# Patient Record
Sex: MALE | Race: WHITE
[De-identification: names, ages, dates, MRNs, and addresses within clinical notes are randomized per-mention and may not be internally consistent; named-entity substitution may affect disease eponyms.]

---

## 2017-10-11 ENCOUNTER — HOSPITAL ENCOUNTER (EMERGENCY)
Dept: HOSPITAL 25 - ED | Age: 62
Discharge: ANOTHER HOSPITAL | End: 2017-10-11
Payer: OTHER GOVERNMENT

## 2017-10-11 VITALS — DIASTOLIC BLOOD PRESSURE: 68 MMHG | SYSTOLIC BLOOD PRESSURE: 137 MMHG

## 2017-10-11 DIAGNOSIS — M79.671: ICD-10-CM

## 2017-10-11 DIAGNOSIS — I74.3: Primary | ICD-10-CM

## 2017-10-11 DIAGNOSIS — I96: ICD-10-CM

## 2017-10-11 LAB
ALBUMIN SERPL BCG-MCNC: 3.4 G/DL (ref 3.2–5.2)
ALP SERPL-CCNC: 71 U/L (ref 34–104)
ALT SERPL W P-5'-P-CCNC: 15 U/L (ref 7–52)
ANION GAP SERPL CALC-SCNC: 10 MMOL/L (ref 2–11)
AST SERPL-CCNC: 14 U/L (ref 13–39)
BUN SERPL-MCNC: 19 MG/DL (ref 6–24)
BUN/CREAT SERPL: 22.4 (ref 8–20)
CALCIUM SERPL-MCNC: 9.3 MG/DL (ref 8.6–10.3)
CHLORIDE SERPL-SCNC: 100 MMOL/L (ref 101–111)
GLOBULIN SER CALC-MCNC: 4.1 G/DL (ref 2–4)
GLUCOSE SERPL-MCNC: 151 MG/DL (ref 70–100)
HCO3 SERPL-SCNC: 24 MMOL/L (ref 22–32)
HCT VFR BLD AUTO: 29 % (ref 42–52)
HGB BLD-MCNC: 9.7 G/DL (ref 14–18)
MCH RBC QN AUTO: 31 PG (ref 27–31)
MCHC RBC AUTO-ENTMCNC: 34 G/DL (ref 31–36)
MCV RBC AUTO: 93 FL (ref 80–94)
POTASSIUM SERPL-SCNC: 4.1 MMOL/L (ref 3.5–5)
PROT SERPL-MCNC: 7.5 G/DL (ref 6.4–8.9)
RBC # BLD AUTO: 3.11 10^6/UL (ref 4–5.4)
SODIUM SERPL-SCNC: 134 MMOL/L (ref 133–145)
WBC # BLD AUTO: 18.9 10^3/UL (ref 3.5–10.8)

## 2017-10-11 PROCEDURE — 85025 COMPLETE CBC W/AUTO DIFF WBC: CPT

## 2017-10-11 PROCEDURE — 87205 SMEAR GRAM STAIN: CPT

## 2017-10-11 PROCEDURE — 96374 THER/PROPH/DIAG INJ IV PUSH: CPT

## 2017-10-11 PROCEDURE — 87070 CULTURE OTHR SPECIMN AEROBIC: CPT

## 2017-10-11 PROCEDURE — 36415 COLL VENOUS BLD VENIPUNCTURE: CPT

## 2017-10-11 PROCEDURE — 87150 DNA/RNA AMPLIFIED PROBE: CPT

## 2017-10-11 PROCEDURE — 87186 SC STD MICRODIL/AGAR DIL: CPT

## 2017-10-11 PROCEDURE — 87640 STAPH A DNA AMP PROBE: CPT

## 2017-10-11 PROCEDURE — 86140 C-REACTIVE PROTEIN: CPT

## 2017-10-11 PROCEDURE — 87641 MR-STAPH DNA AMP PROBE: CPT

## 2017-10-11 PROCEDURE — 87040 BLOOD CULTURE FOR BACTERIA: CPT

## 2017-10-11 PROCEDURE — 83605 ASSAY OF LACTIC ACID: CPT

## 2017-10-11 PROCEDURE — 87077 CULTURE AEROBIC IDENTIFY: CPT

## 2017-10-11 PROCEDURE — 81003 URINALYSIS AUTO W/O SCOPE: CPT

## 2017-10-11 PROCEDURE — 85652 RBC SED RATE AUTOMATED: CPT

## 2017-10-11 PROCEDURE — 99285 EMERGENCY DEPT VISIT HI MDM: CPT

## 2017-10-11 PROCEDURE — 80053 COMPREHEN METABOLIC PANEL: CPT

## 2017-10-11 NOTE — ED
David CASTRO Angela, scribed for Blaine Seymour MD on 10/11/17 at 1718 .





Lower Extremity





- HPI Summary


HPI Summary: 





This pt is a 63 y/o male presenting to [facility] c/o right foot pain especially the 

first and second toe for the last 2 weeks. He states that he noticed his toe 

was black just today may be late last night. Pt reports  he had 4 stents placed 

in his right leg approximately 12 months ago by his surgeon in [place]. He 

reports that he had a scheduled appointment with his vascular surgeon on 

October 2nd but they called him and it was moved to October 30th. Pt lives in 

an assisted living care facility ([facility]) in [place], [place]. He has PMH 

significant for DM, HTN, dyslipidemia and PAD and PVD. HE still smoke 5 

cigarrets per day and he takes and ASA daily. 





- History of Current Complaint


Chief Complaint: EDExtremityLower


Stated Complaint: RT FOOT/GREAT TOE PAIN


Time Seen by Provider: 10/11/17 17:06


Hx Obtained From: Patient


Onset of Pain: Days - right great toe pain


Onset/Duration: Weeks - right great toe pain


Pain Intensity: 8


Pain Scale Used: 0-10 Numeric


Timing: Constant, Lasting Weeks - pain


Location: Is Discrete @ - right great toe


Associated Signs And Symptoms: Positive: Other - black great toe





- Allergies/Home Medications


Allergies/Adverse Reactions: 


 Allergies











Allergy/AdvReac Type Severity Reaction Status Date / Time


 


No Known Allergies Allergy   Verified 10/11/17 14:11











Home Medications: 


 Home Medications





Acetaminophen TAB* [Tylenol TAB*] 650 mg PO Q6H PRN 10/11/17 [History Confirmed 

10/11/17]


Aspirin EC Low Dose* [Ecotrin EC Low Dose 81 MG*] 81 mg PO DAILY 10/11/17 [

History Confirmed 10/11/17]


Atenolol TAB* [Tenormin TAB* 25 MG] 25 mg PO DAILY 10/11/17 [History Confirmed 

10/11/17]


Atorvastatin* [Lipitor*] 40 mg PO DAILY 10/11/17 [History Confirmed 10/11/17]


Cholecalciferol TAB* [Vitamin D TAB*] 1,000 unit PO DAILY 10/11/17 [History 

Confirmed 10/11/17]


Collagenase 250 MG/GM OINT* [Santyl 250 mg/gm Oint*] 1 applic TOPICAL DAILY PRN 

10/11/17 [History Confirmed 10/11/17]


Emollient [Lubriderm Daily Moisture] 1 lot TOPICAL TID PRN 10/11/17 [History 

Confirmed 10/11/17]


Ezetimibe TAB* [Zetia TAB*] 10 mg PO DAILY 10/11/17 [History Confirmed 10/11/17]


Gabapentin CAP(*) [Neurontin 400 mg CAP(*)] 400 mg PO BEDTIME 10/11/17 [History 

Confirmed 10/11/17]


Lisinopril TAB* [Prinivil TAB*] 10 mg PO DAILY 10/11/17 [History Confirmed 10/11

/17]


Venlafaxine HCl [Effexor XR-] 37.5 mg PO BID 10/11/17 [History Confirmed 10/11/

17]


buPROPion TAB* [Wellbutrin TAB*] 75 mg PO BID 10/11/17 [History Confirmed 10/11/

17]


glipiZIDE TAB* [Glucotrol TAB*] 5 mg PO QPM 10/11/17 [History Confirmed 10/11/17

]


glipiZIDE TAB* [Glucotrol TAB*] 7.5 mg PO QAM 10/11/17 [History Confirmed 10/11/

17]


metFORMIN* [Glucophage 500 MG TAB *] 1,000 mg PO BID 10/11/17 [History 

Confirmed 10/11/17]











PMH/Surg Hx/FS Hx/Imm Hx


Endocrine/Hematology History: Reports: Hx Diabetes


Cardiovascular History: 


   Denies: Hx Hypertension


Infectious Disease History: No


Infectious Disease History: Reports: Hx of Known/Suspected MRSA


   Denies: Traveled Outside the [place] in Last 30 Days





- Social History


Lives: Assisted Living - [facility]


Alcohol Use: None


Hx Substance Use: No


Substance Use Type: Reports: None





Review of Systems


Negative: Fever, Chills


Eyes: Negative


ENT: Negative


Cardiovascular: Negative


Respiratory: Negative


Genitourinary: Negative


Positive: Other - right great toe pain


Positive: Other - black right great toe


All Other Systems Reviewed And Are Negative: Yes





Physical Exam





- Summary


Physical Exam Summary: 





VITAL SIGNS: Reviewed.


GENERAL: Patient is a well-developed and nourished male who is lying 

comfortable in the stretcher. Patient is not in any acute respiratory distress.


HEAD AND FACE: No signs of trauma. No ecchymosis, hematomas or skull 

depressions. No sinus tenderness.


EYES: PERRLA, EOMI x 2, No injected conjunctiva, no nystagmus.


EARS: Hearing grossly intact. Ear canals and tympanic membranes are within 

normal limits.


MOUTH: Oropharynx within normal limits.


NECK: Supple, trachea is midline, no adenopathy, no JVD, no carotid bruit, no c-

spine tenderness, neck with full ROM.


CHEST: Symmetric, no tenderness at palpation


LUNGS: Clear to auscultation bilaterally. No wheezing or crackles.


CVS: Regular rate and rhythm, S1 and S2 present, no murmurs or gallops 

appreciated.


ABDOMEN: Soft, non-tender. No signs of distention. No rebound no guarding, and 

no masses palpated. Bowel sounds are normal.


EXTREMITIES: FROM in all major joints, no edema, no cyanosis or clubbing. There 

are no pulses in the right lower extremity, not even with a doppler. On the RLE

: there is necrosis on the first digit and on the second digit there is white 

discoloration secondary to vascular insufficiency. This has been going on for 2 

weeks.


NEURO: Alert and oriented x 3. No acute neurological deficits. Speech is normal 

and follows commands.


SKIN: Dry and warm


Triage Information Reviewed: Yes


Vital Signs On Initial Exam: 


 Initial Vitals











Temp Pulse Resp BP Pulse Ox


 


 97.0 F   72   16   109/52   97 


 


 10/11/17 14:11  10/11/17 14:11  10/11/17 14:11  10/11/17 14:11  10/11/17 14:11











Vital Signs Reviewed: Yes





Diagnostics





- Vital Signs


 Vital Signs











  Temp Pulse Resp BP Pulse Ox


 


 10/11/17 14:11  97.0 F  72  16  109/52  97














- Laboratory


Lab Results: 


 Lab Results











  10/11/17 Range/Units





  17:38 


 


WBC  18.9 H  (3.5-10.8)  10^3/ul


 


RBC  3.11 L  (4.0-5.4)  10^6/ul


 


Hgb  9.7 L  (14.0-18.0)  g/dl


 


Hct  29 L  (42-52)  %


 


MCV  93  (80-94)  fL


 


MCH  31  (27-31)  pg


 


MCHC  34  (31-36)  g/dl


 


RDW  14  (10.5-15)  %


 


Plt Count  558 H  (150-450)  10^3/ul


 


MPV  7 L  (7.4-10.4)  um3


 


Neut % (Auto)  81.3  (38-83)  %


 


Lymph % (Auto)  9.2 L  (25-47)  %


 


Mono % (Auto)  7.5  (1-9)  %


 


Eos % (Auto)  1.2  (0-6)  %


 


Baso % (Auto)  0.8  (0-2)  %


 


Absolute Neuts (auto)  15.4 H  (1.5-7.7)  10^3/ul


 


Absolute Lymphs (auto)  1.7  (1.0-4.8)  10^3/ul


 


Absolute Monos (auto)  1.4 H  (0-0.8)  10^3/ul


 


Absolute Eos (auto)  0.2  (0-0.6)  10^3/ul


 


Absolute Basos (auto)  0.1  (0-0.2)  10^3/ul


 


Absolute Nucleated RBC  0  10^3/ul


 


Nucleated RBC %  0  


 


ESR  Pending  











Result Diagrams: 


 10/11/17 17:38





 10/11/17 17:38


Lab Statement: Any lab studies that have been ordered have been reviewed, and 

results considered in the medical decision making process.





- Radiology


  ** Right foot XR


Xray Interpretation: Positive (See Comments) - IMPRESSION: Soft tissue 

swelling. No radiographic stigmata of osteomyelitis. ED physician has reviewed 

this radiology report and agrees.


Radiology Interpretation Completed By: Radiologist





Lower Extremity Course/Dx





- Course


Assessment/Plan: This pt is a 63 y/o male presenting to [facility] c/o right foot 

pain especially the first and second toe for the last 2 weeks. He states that 

he noticed his toe was black just today may be late last night. Pt reports  he 

had 4 stents placed in his right leg approximately 12 months ago by his surgeon 

in [place]. He reports that he had a scheduled appointment with his vascular 

surgeon on October 2nd but they called him and it was moved to October 30th. Pt 

lives in an assisted living care facility ([facility]) in [place], [place]. He has PMH 

significant for DM, HTN, dyslipidemia and PAD and PVD. HE still smoke 5 

cigarrets per day and he takes and ASA daily.  Blood work w/o a significant 

abnormality except for WBCs 18.9, slight anemia hb 9.7 and Hct 29. Platelets 

558. Glucose 151, lactic acid 2.1, .  Initially I was not able to feel 

pulses. Using doppler I was not able to obtain pulses. I obtained a IV access 

and IVF given. He was given Vancomycin for his infection and gangrene.  I 

discussed my physical exam and findings with Dr. Adrian from [facility] and he accepted the patient for transfer.  Dr. Adrian agreed with 

current management.  Patient was given one dose of Morphine for pain.  Patient 

is hemodynamically stable and A+OX3.





- Diagnoses


Differential Diagnosis/HQI/PQRI: Positive: Cellulitis, Fracture (Closed), 

Infection, Osteomyelitis


Provider Diagnoses: 


 acute ischemic RLE, secondary to arterial occlusion, Necrosis of toe








- Physician Notifications


Discussed Care Of Patient With: Dr. Adrian


Time Discussed With Above Provider: 17:50


Instructed by Provider To: Other - I discussed the pt's case with Dr. Adrian, 

from [facility], who has agreed to admit the pt.





Discharge





- Discharge Plan


Condition: Stable


Disposition: TRANS HIGHER LVL OF CARE FAC


Discharge Disposition Comment: [facility]


Referrals: 


Non Staff,Doctor [Primary Care Provider] - 





The documentation as recorded by the David dillard Angela accurately reflects 

the service I personally performed and the decisions made by me, Blaine Seymour MD.

## 2017-10-11 NOTE — RAD
Indication: Pain and swelling. Necrotic fifth digit.



Comparison: No relevant prior exams available on the [facility] PACS for comparison.



Technique: AP, lateral, and oblique views RIGHT foot. 



Report: Soft tissue swelling about the foot without focality. No subcutaneous emphysema

evident. No fracture, malalignment, periosteal reaction, or osteolysis evident. Normal

variant bipartite sesamoid at the lateral head of the flexor hallucis brevis.



IMPRESSION:  Soft tissue swelling. No radiographic stigmata of osteomyelitis.

## 2017-10-12 NOTE — PN
Progress Note





- Progress Note


Date of Service: 10/12/17


Note: 


Patient wound culture grew MRSA.  patient given vancomycin here prior to 

transfer to [facility] so no further action needed.

## 2018-12-03 ENCOUNTER — HOSPITAL ENCOUNTER (EMERGENCY)
Dept: HOSPITAL 25 - ED | Age: 63
Discharge: HOME | End: 2018-12-03
Payer: OTHER GOVERNMENT

## 2018-12-03 VITALS — DIASTOLIC BLOOD PRESSURE: 59 MMHG | SYSTOLIC BLOOD PRESSURE: 129 MMHG

## 2018-12-03 DIAGNOSIS — Z87.891: ICD-10-CM

## 2018-12-03 DIAGNOSIS — D72.829: ICD-10-CM

## 2018-12-03 DIAGNOSIS — E11.65: Primary | ICD-10-CM

## 2018-12-03 DIAGNOSIS — Z79.84: ICD-10-CM

## 2018-12-03 DIAGNOSIS — E86.0: ICD-10-CM

## 2018-12-03 LAB
HCT VFR BLD AUTO: 42 % (ref 42–52)
HGB BLD-MCNC: 13.9 G/DL (ref 14–18)
MCH RBC QN AUTO: 32 PG (ref 27–31)
MCHC RBC AUTO-ENTMCNC: 33 G/DL (ref 31–36)
MCV RBC AUTO: 95 FL (ref 80–94)
NEUTROPHILS # BLD: 14.8 10^3/UL (ref 1.5–7.7)
PLATELET # BLD AUTO: 264 10^3/UL (ref 150–450)
RBC # BLD AUTO: 4.39 10^6/UL (ref 4–5.4)
WBC # BLD AUTO: 16.4 10^3/UL (ref 3.5–10.8)

## 2018-12-03 PROCEDURE — 81003 URINALYSIS AUTO W/O SCOPE: CPT

## 2018-12-03 PROCEDURE — 36415 COLL VENOUS BLD VENIPUNCTURE: CPT

## 2018-12-03 PROCEDURE — 71046 X-RAY EXAM CHEST 2 VIEWS: CPT

## 2018-12-03 PROCEDURE — 85025 COMPLETE CBC W/AUTO DIFF WBC: CPT

## 2018-12-03 PROCEDURE — 99283 EMERGENCY DEPT VISIT LOW MDM: CPT

## 2018-12-03 PROCEDURE — 80053 COMPREHEN METABOLIC PANEL: CPT

## 2018-12-03 NOTE — ED
Complex/Multi-Sys Presentation





- HPI Summary


HPI Summary: 


Patient is a 63-year-old male who presents emergency department for elevated 

glucose.  Patient resides at the [facility] and has a history of drug and 

alcohol abuse.  Patient states that his glucose was checked today and was noted 

to be in the 300s and was referred to the ER.  Patient has no complaints.  

Patient denies recent illness, fever or chills, cough, abdominal pain, urinary 

symptoms.  He does note one episode of vomiting today without diarrhea.  

Symptoms are moderate in severity.  No current modifying factors.  Patient 

currently taking metformin and glipizide for glucose control.








- History Of Current Complaint


Chief Complaint: EDDiabeticProb


Time Seen by Provider: 12/03/18 13:22


Hx Obtained From: Patient, Medical Records





- Allergies/Home Medications


Allergies/Adverse Reactions: 


 Allergies











Allergy/AdvReac Type Severity Reaction Status Date / Time


 


No Known Allergies Allergy   Verified 10/11/17 14:11














PMH/Surg Hx/FS Hx/Imm Hx


Previously Healthy: Yes


Endocrine/Hematology History: Reports: Hx Diabetes


Cardiovascular History: 


   Denies: Hx Hypertension


Infectious Disease History: No


Infectious Disease History: Reports: Hx of Known/Suspected MRSA


   Denies: Traveled Outside the [place] in Last 30 Days





- Family History


Known Family History: Positive: Diabetes





- Social History


Occupation: Retired


Lives: Assisted Living


Alcohol Use: None


Hx Substance Use: No


Substance Use Type: Reports: None


Smoking Status (MU): Former Smoker





Review of Systems


Constitutional: Negative


Negative: Fever, Chills


Eyes: Negative


ENT: Negative


Cardiovascular: Negative


Respiratory: Negative


Positive: Vomiting.  Negative: Abdominal Pain, Diarrhea, Nausea


Genitourinary: Negative


Musculoskeletal: Negative


Skin: Negative


Neurological: Negative


All Other Systems Reviewed And Are Negative: Yes





Physical Exam


Triage Information Reviewed: Yes


Vital Signs On Initial Exam: 


 Initial Vitals











Temp Pulse Resp BP Pulse Ox


 


 96.2 F   63   18   160/85   96 


 


 12/03/18 13:19  12/03/18 13:19  12/03/18 13:19  12/03/18 13:19  12/03/18 13:19











Vital Signs Reviewed: Yes


Appearance: Positive: Well-Appearing - Pt. sitting up in NAD. Pleasant. Answers 

questions appropriately.


Skin: Positive: Warm, Dry


Head/Face: Positive: Normal Head/Face Inspection


Eyes: Positive: Normal, EOMI


Neck: Positive: Supple


Respiratory/Lung Sounds: Positive: Clear to Auscultation, Breath Sounds Present


Cardiovascular: Positive: Normal, RRR


Abdomen Description: Positive: Nontender, Soft


Musculoskeletal: Positive: Other - Partial amputation of right foot with healed 

wounds. No ulcerations or wounds to bilateral lower extremities.


Neurological: Positive: Normal, CN Intact II-III


Psychiatric: Positive: Affect/Mood Appropriate





- Chase Coma Scale


Best Eye Response: 4 - Spontaneous


Best Motor Response: 6 - Obeys Commands


Best Verbal Response: 5 - Oriented


Coma Scale Total: 15





Diagnostics





- Vital Signs


 Vital Signs











  Temp Pulse Resp BP Pulse Ox


 


 12/03/18 13:19  96.2 F  63  18  160/85  96














- Laboratory


Result Diagrams: 


 12/03/18 14:36





 12/03/18 14:36


Lab Statement: Any lab studies that have been ordered have been reviewed, and 

results considered in the medical decision making process.





Complex Multi-Symp Course/Dx


Course Of Treatment: Pt. sent to ED by assisted living facility for elevated 

glucose. Pt. in ED has no complaints and denies pain. He is afebrile and 

nontoxic. VS stable. Will check basic labs and urine.  CBC shows leukocytosis 

of 16.4. CMP shows glucose of 300, normal anion gap, K minimally elevated at 

5.1. Chest xray is negative for infiltrate or acute findings, per radiology. 

Pending U/A at this time.  U/A shows trace ketones and glucose without signs of 

infection.  On re-exam pt. sitting up in bed watching TV. He ate tuna salad and 

milk in ED without vomiting. Pt. states he is feeling well. Pt[facility] notes [facility] Pt. examined by Dr. Grace as well. Plan to dc home. Advised 

pt. he needs to see his PCP in 1-2 days for repeat labs and evaluation. To 

increase fluids. Will return to ER if sxs change or worsen. Pt. understands and 

agrees with plan.


Assessment/Plan: Assessed the patient after being evaluated by Angel MATHIS.

  Hyperglycemia and leukocytosis today, without fever.  Source may be related 

to one episode of dry heaving this morning, perhaps starting viral syndrome.  

Foot exam unremarkable, urine, CXR, labs, all unremarkable.  Patient nontoxic 

appearance, ambulatory, tolerating PO.  Plan in conjunction with Anegl MATHIS

: Close followup [facility] clinic within 24-48 hours (I gave 

pamphlet), increase metformin to 1500 in AM and 1000 mg PM - temporary measure 

for a few days until reassess.  Increase oral intake.





- Diagnoses


Provider Diagnoses: 


 Leukocytosis, Hyperglycemia, Uncontrolled diabetes mellitus, Dehydration








Discharge





- Sign-Out/Discharge


Documenting (check all that apply): Patient Departure





- Discharge Plan


Condition: Good


Disposition: HOME


Prescriptions: 


Ondansetron TAB* [Zofran 4 MG Tab*] 4 mg PO Q6H PRN #12 tab


 PRN Reason: Nausea


Patient Education Materials:  Dehydration (ED), Acute Nausea and Vomiting (ED)


Referrals: 


[facility] [Outside]


Non Staff,Doctor [Medical Doctor] - 


Additional Instructions: 


Increase to three metformin tablets in the morning. Call PCP tomorrow to 

schedule an appointment for 1-2 days for re-evaluation and repeat blood work


Increase fluids


Return to ER if symptoms change or worsen





- Billing Disposition and Condition


Condition: GOOD


Disposition: Home